# Patient Record
Sex: MALE | Race: WHITE | NOT HISPANIC OR LATINO | Employment: FULL TIME | URBAN - NONMETROPOLITAN AREA
[De-identification: names, ages, dates, MRNs, and addresses within clinical notes are randomized per-mention and may not be internally consistent; named-entity substitution may affect disease eponyms.]

---
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
## 2020-07-06 ENCOUNTER — OFFICE VISIT (OUTPATIENT)
Dept: FAMILY MEDICINE | Facility: OTHER | Age: 20
End: 2020-07-06
Attending: PHYSICIAN ASSISTANT
Payer: COMMERCIAL

## 2020-07-06 VITALS
WEIGHT: 179.2 LBS | DIASTOLIC BLOOD PRESSURE: 70 MMHG | SYSTOLIC BLOOD PRESSURE: 120 MMHG | RESPIRATION RATE: 20 BRPM | TEMPERATURE: 97.8 F | HEART RATE: 64 BPM

## 2020-07-06 DIAGNOSIS — H69.93 DYSFUNCTION OF BOTH EUSTACHIAN TUBES: Primary | ICD-10-CM

## 2020-07-06 PROCEDURE — 99213 OFFICE O/P EST LOW 20 MIN: CPT | Performed by: PHYSICIAN ASSISTANT

## 2020-07-06 RX ORDER — FLUTICASONE PROPIONATE 50 MCG
2 SPRAY, SUSPENSION (ML) NASAL DAILY
Qty: 16 G | Refills: 11 | Status: SHIPPED | OUTPATIENT
Start: 2020-07-06 | End: 2021-10-06

## 2020-07-06 ASSESSMENT — PAIN SCALES - GENERAL: PAINLEVEL: MILD PAIN (2)

## 2020-07-06 NOTE — NURSING NOTE
Chief Complaint   Patient presents with     Ear Problem         Medication Reconciliation: complete    Esperanza Valdez, LPN

## 2020-07-06 NOTE — PROGRESS NOTES
Nursing Notes:   Esperanza Valdez LPN  7/6/2020 11:03 AM  Signed  Chief Complaint   Patient presents with     Ear Problem         Medication Reconciliation: complete    Esperanza Valdez LPN      HPI:    Dante Hernandez is a 19 year old male who presents for ear pain.  Patient has had ear pain off and on over the last 2 years.  Patient was in clinic approximately 1 year ago was told he had fluid behind his eardrums.  Currently having increased ear pain after swimming for approximately 5 minutes.  Pain goes up and down depending on his swimming activities.  Feels like it is worse this year.  Had increased pain this past evening.  Previously had tried decongestion and Flonase nasal spray which helped mildly.  Not currently taking.  No other cough or cold symptoms.  No fevers, chills, sore throat, sinus pain or pressure, nasal drainage, cough.  No ear drainage.    History reviewed. No pertinent past medical history.    History reviewed. No pertinent surgical history.    History reviewed. No pertinent family history.    Social History     Tobacco Use     Smoking status: Never Smoker     Smokeless tobacco: Never Used   Substance Use Topics     Alcohol use: No       Current Outpatient Medications   Medication Sig Dispense Refill     fluticasone (FLONASE) 50 MCG/ACT nasal spray Spray 2 sprays into both nostrils daily 16 g 11       No Known Allergies    REVIEW OF SYSTEMS:  Refer to HPI.    EXAM:   Vitals:    /70 (BP Location: Right arm, Patient Position: Sitting, Cuff Size: Adult Regular)   Pulse 64   Temp 97.8  F (36.6  C)   Resp 20   Wt 81.3 kg (179 lb 3.2 oz)     General Appearance: Pleasant, alert, appropriate appearance for age. No acute distress  Ear Exam: Normal TM's bilaterally, grey, translucent, bony landmarks appreciated.   Left/Right TM: Effusion is not present. TM is not bulging. There is no pus appreciated.    Normal auditory canals and external ears. Non-tender.  OroPharynx  Exam:  Non-erythematous posterior pharynx with no exudates. No sinus pain upon palpation of frontal, ethmoid, and maxillary sinuses.  Chest/Respiratory Exam: Normal chest wall and respirations. Clear to auscultation. No retractions appreciated.  Cardiovascular Exam: Regular rate and rhythm. S1, S2, no murmur, click, gallop, or rubs.  Lymphatic Exam: Neg.  Skin: no rash or abnormalities  Psychiatric Exam: Alert and oriented - appropriate affect.    PHQ Depression Screen  No flowsheet data found.    LABS:    No results found for any visits on 07/06/20.      ASSESSMENT AND PLAN:      ICD-10-CM    1. Dysfunction of both eustachian tubes  H69.83 fluticasone (FLONASE) 50 MCG/ACT nasal spray     OTOLARYNGOLOGY REFERRAL         No infection concerns are appreciated this time.  No antibiotics are warranted at this time.    Dysfunction of both eustachian tubes: Encouraged to restart Flonase nasal spray and a decongestion such as Sudafed or Mucinex.  Encouraged to try persistently over the next 2 to 4 weeks.  Continue Flonase while he is swimming this summer.  Referred to ENT for a consult if symptoms are not calming down or worsening if needed.  Return to clinic with change/worsening of symptoms.     Patient Instructions   May use symptomatic care with tylenol or ibuprofen. Sudafed or mucinex work well for congestion.     Please take tylenol or ibuprofen as needed up to 4 times daily.      Monitor for any fevers or chills. Return in 7-10 days if not feeling better. Please call clinic with any questions or concerns. Return to clinic with change/worsening of symptoms.   Encouraged fluids and rest.    Call 9-1-1 or go to the emergency room if you:  Have trouble breathing   Are drooling because you cannot swallow your saliva   Have swelling of the neck or tongue   Cannot move your neck or have trouble opening your mouth           Gracie Kelley PA-C PA-C..................7/6/2020 11:03 AM

## 2020-07-06 NOTE — PATIENT INSTRUCTIONS
May use symptomatic care with tylenol or ibuprofen. Sudafed or mucinex work well for congestion.     Please take tylenol or ibuprofen as needed up to 4 times daily.      Monitor for any fevers or chills. Return in 7-10 days if not feeling better. Please call clinic with any questions or concerns. Return to clinic with change/worsening of symptoms.   Encouraged fluids and rest.    Call 9-1-1 or go to the emergency room if you:  Have trouble breathing   Are drooling because you cannot swallow your saliva   Have swelling of the neck or tongue   Cannot move your neck or have trouble opening your mouth

## 2020-08-03 DIAGNOSIS — H91.93 DECREASED HEARING OF BOTH EARS: Primary | ICD-10-CM

## 2020-09-29 ENCOUNTER — ALLIED HEALTH/NURSE VISIT (OUTPATIENT)
Dept: FAMILY MEDICINE | Facility: OTHER | Age: 20
End: 2020-09-29
Attending: FAMILY MEDICINE
Payer: COMMERCIAL

## 2020-09-29 DIAGNOSIS — Z20.822 ENCOUNTER FOR LABORATORY TESTING FOR COVID-19 VIRUS: Primary | ICD-10-CM

## 2020-09-29 PROCEDURE — 99207 ZZC NO CHARGE NURSE ONLY: CPT

## 2020-09-29 PROCEDURE — U0003 INFECTIOUS AGENT DETECTION BY NUCLEIC ACID (DNA OR RNA); SEVERE ACUTE RESPIRATORY SYNDROME CORONAVIRUS 2 (SARS-COV-2) (CORONAVIRUS DISEASE [COVID-19]), AMPLIFIED PROBE TECHNIQUE, MAKING USE OF HIGH THROUGHPUT TECHNOLOGIES AS DESCRIBED BY CMS-2020-01-R: HCPCS | Mod: ZL | Performed by: FAMILY MEDICINE

## 2020-09-29 PROCEDURE — C9803 HOPD COVID-19 SPEC COLLECT: HCPCS

## 2020-09-30 LAB
SARS-COV-2 RNA SPEC QL NAA+PROBE: NOT DETECTED
SPECIMEN SOURCE: NORMAL

## 2021-01-03 ENCOUNTER — HEALTH MAINTENANCE LETTER (OUTPATIENT)
Age: 21
End: 2021-01-03

## 2021-10-06 ENCOUNTER — OFFICE VISIT (OUTPATIENT)
Dept: FAMILY MEDICINE | Facility: OTHER | Age: 21
End: 2021-10-06
Attending: PHYSICIAN ASSISTANT
Payer: COMMERCIAL

## 2021-10-06 VITALS
HEIGHT: 72 IN | WEIGHT: 187.4 LBS | OXYGEN SATURATION: 95 % | BODY MASS INDEX: 25.38 KG/M2 | HEART RATE: 88 BPM | RESPIRATION RATE: 18 BRPM | TEMPERATURE: 98.1 F | SYSTOLIC BLOOD PRESSURE: 118 MMHG | DIASTOLIC BLOOD PRESSURE: 64 MMHG

## 2021-10-06 DIAGNOSIS — L84 CORN OR CALLUS: Primary | ICD-10-CM

## 2021-10-06 PROCEDURE — 99213 OFFICE O/P EST LOW 20 MIN: CPT | Performed by: PHYSICIAN ASSISTANT

## 2021-10-06 ASSESSMENT — PAIN SCALES - GENERAL: PAINLEVEL: NO PAIN (0)

## 2021-10-06 ASSESSMENT — MIFFLIN-ST. JEOR: SCORE: 1889.07

## 2021-10-07 NOTE — NURSING NOTE
Chief Complaint   Patient presents with     Foot Problems     He has a red spot on the right side of his foot and some swelling. He was told to get it checked out to see if it is a bunion. He states that his work boot has been rubbing on it.     Initial There were no vitals taken for this visit. There is no height or weight on file to calculate BMI.     FOOD SECURITY SCREENING QUESTIONS  Hunger Vital Signs:  Within the past 12 months we worried whether our food would run out before we got money to buy more. Never  Within the past 12 months the food we bought just didn't last and we didn't have money to get more. Never      Medication Reconciliation: Complete      Sohail Hardy LPN

## 2021-10-07 NOTE — PROGRESS NOTES
ASSESSMENT/PLAN:    I have reviewed the nursing notes.  I have reviewed the findings, diagnosis, plan and need for follow up with the patient.    1. Corn or callus  - Vitals stable. Exam consistent with corn due to friction of new steel toed boots. Discussed the anatomy, pathophysiology and typical treatment plan of corns, recommend looser shoe wear/changing shoes, corn pads, avoiding moisture/sweat in shoes as possible. Use of orthotics as needed for symptom control. Avoid picking at site. Discussed that surgery is not warranted for corns routinely. Patient is in agreement and understanding of the above treatment plan. All questions and concerns were addressed and answered to patient's satisfaction. AVS reviewed with patient.     Discussed warning signs/symptoms indicative of need to f/u    Follow up if symptoms persist or worsen or concerns    I explained my diagnostic considerations and recommendations to the patient, who voiced understanding and agreement with the treatment plan. All questions were answered. We discussed potential side effects of any prescribed or recommended therapies, as well as expectations for response to treatments.    Brittany Johnson PA-C  10/6/2021  7:29 PM    HPI:    Dante Hernandez is a 21 year old male  who presents to [facility] today for concerns of foot pain, right sided x a few weeks duration. He wears steel toed boots at work and got a new pair a month ago. Notes that his feet are sweaty at work and boots can feel tight at times. Pain is very minimal in nature and more of an annoyance to lateral MTP joint of small toe. Worse with sweat and tight boots, better with shoes off. Denies numbness, tingling or burning. No mechanical symptoms. No injury. No bruising, erythema or edema.     Allergies: lactose    PCP: none     History reviewed. No pertinent past medical history.  History reviewed. No pertinent surgical history.  Social History     Tobacco Use     Smoking status:  "Never Smoker     Smokeless tobacco: Never Used   Substance Use Topics     Alcohol use: No     No current outpatient medications on file.     Allergies   Allergen Reactions     Lactose      GAS     Past medical history, past surgical history, current medications and allergies reviewed and accurate to the best of my knowledge.      ROS:  Refer to HPI    /64   Pulse 88   Temp 98.1  F (36.7  C) (Tympanic)   Resp 18   Ht 1.822 m (5' 11.75\")   Wt 85 kg (187 lb 6.4 oz)   SpO2 95%   BMI 25.59 kg/m      EXAM:  General Appearance: Well appearing 21-year old male, appropriate appearance for age. No acute distress  Respiratory: normal chest wall and respirations.  Normal effort.  Clear to auscultation bilaterally, no wheezing, crackles or rhonchi.  No increased work of breathing.  No cough appreciated.  Cardiac: RRR with no murmurs  Musculoskeletal:  Equal movement of bilateral upper extremities.  Equal movement of bilateral lower extremities.  Normal gait.    Dermatological: corn to 5th toe MTP joint on right which is very minimally tender to palpation. Flesh colored skin without erythema or ecchymosis, no trauma to site. No additional symptoms to report.   Psychological: normal affect, alert, oriented, and pleasant.     Labs:  None     Xray:  None     "

## 2021-10-07 NOTE — PATIENT INSTRUCTIONS
Patient Education     Treating Corns and Calluses   If your corns or calluses are mild, reducing friction may help. Different shoes, moleskin patches, or soft pads may be all the treatment you need. In more severe cases, treating tissue buildup may require your doctor s care. Sometimes custom-made shoe inserts (orthotics) or special pads are prescribed to reduce friction and pressure.    Change shoes  If you have corns, your doctor may suggest wearing shoes that have more toe room. This way, buckled joints are less likely to be pinched against the top of the shoe. If you have calluses, wearing a cushioned insole, arch support, or heel counter can help reduce friction.  Visit your doctor  In some cases, your doctor may trim away the outer layers of skin that make up the corn or callus. For a painful corn, medicine may be injected beneath the built-up tissue.  Wear orthotics  Orthotics are specially made to meet the needs of your feet. They cushion calluses or divert pressure away from these problem areas. Worn as directed, orthotics help limit existing problems and prevent new ones from forming.  If you need surgery  If a bone or joint is out of place, certain parts of your foot may be under too much pressure. This can cause severe corns and calluses. In such cases, surgery may be the best way to correct the problem.  Outpatient procedures  In most cases, surgery to improve bone position is an outpatient procedure. Your doctor may cut away excess bone, reposition prominent bones, or even fuse joints. Sometimes tendons or ligaments are cut to reduce tension on a bone or joint. Your doctor will talk with you about the procedure that is best suited to your needs.  Alexei last reviewed this educational content on 12/1/2019 2000-2021 [employer]. All rights reserved. This information is not intended as a substitute for professional medical care. Always follow your healthcare professional's  instructions.

## 2021-10-10 ENCOUNTER — HEALTH MAINTENANCE LETTER (OUTPATIENT)
Age: 21
End: 2021-10-10

## 2022-01-29 ENCOUNTER — HEALTH MAINTENANCE LETTER (OUTPATIENT)
Age: 22
End: 2022-01-29

## 2022-08-09 ENCOUNTER — OFFICE VISIT (OUTPATIENT)
Dept: FAMILY MEDICINE | Facility: OTHER | Age: 22
End: 2022-08-09
Attending: NURSE PRACTITIONER
Payer: OTHER MISCELLANEOUS

## 2022-08-09 VITALS
TEMPERATURE: 97.2 F | OXYGEN SATURATION: 98 % | WEIGHT: 200 LBS | RESPIRATION RATE: 18 BRPM | HEART RATE: 73 BPM | HEIGHT: 72 IN | DIASTOLIC BLOOD PRESSURE: 92 MMHG | BODY MASS INDEX: 27.09 KG/M2 | SYSTOLIC BLOOD PRESSURE: 114 MMHG

## 2022-08-09 DIAGNOSIS — S61.411A LACERATION OF RIGHT HAND WITHOUT FOREIGN BODY, INITIAL ENCOUNTER: Primary | ICD-10-CM

## 2022-08-09 PROCEDURE — 250N000009 HC RX 250: Performed by: NURSE PRACTITIONER

## 2022-08-09 PROCEDURE — 90715 TDAP VACCINE 7 YRS/> IM: CPT | Performed by: NURSE PRACTITIONER

## 2022-08-09 PROCEDURE — 12001 RPR S/N/AX/GEN/TRNK 2.5CM/<: CPT | Performed by: NURSE PRACTITIONER

## 2022-08-09 PROCEDURE — 99213 OFFICE O/P EST LOW 20 MIN: CPT | Mod: 25 | Performed by: NURSE PRACTITIONER

## 2022-08-09 PROCEDURE — 90471 IMMUNIZATION ADMIN: CPT | Performed by: NURSE PRACTITIONER

## 2022-08-09 RX ORDER — LIDOCAINE HYDROCHLORIDE 10 MG/ML
5 INJECTION, SOLUTION EPIDURAL; INFILTRATION; INTRACAUDAL; PERINEURAL ONCE
Status: COMPLETED | OUTPATIENT
Start: 2022-08-09 | End: 2022-08-09

## 2022-08-09 RX ADMIN — LIDOCAINE HYDROCHLORIDE 5 ML: 10 INJECTION, SOLUTION EPIDURAL; INFILTRATION; INTRACAUDAL; PERINEURAL at 12:09

## 2022-08-09 ASSESSMENT — PAIN SCALES - GENERAL: PAINLEVEL: NO PAIN (1)

## 2022-08-09 NOTE — NURSING NOTE
Pt presents to clinic today for a work comp right hand injury. Injury was today at at [employer] construction around 0930 today. Currently soaking his right hand in Hibiclens       FOOD SECURITY SCREENING QUESTIONS:    The next two questions are to help us understand your food security.  If you are feeling you need any assistance in this area, we have resources available to support you today.    Hunger Vital Signs:  Within the past 12 months we worried whether our food would run out before we got money to buy more. Never  Within the past 12 months the food we bought just didn't last and we didn't have money to get more. Never            Medication Reconciliation: complete  Amy Corrales, ABIMAEL,LPN on 8/9/2022 at 10:52 AM

## 2022-08-09 NOTE — PROGRESS NOTES
ASSESSMENT/PLAN:    I have reviewed the nursing notes.  I have reviewed the findings, diagnosis, plan and need for follow up with the patient.    1. Laceration of right hand without foreign body, initial encounter  - lidocaine (PF) (XYLOCAINE) 1 % injection 5 mL  - TDAP VACCINE (Adacel, Boostrix)  [[id number]]  Today tetanus today.  9 sutures were placed.  Recommend returning in 12 to 14 days for removal of sutures.  He may return to work with no restrictions.  Keep the site covered with bandage for 24 hours, see AVS for additional information I discussed with patient today.    Follow up if symptoms persist or worsen or concerns    I explained my diagnostic considerations and recommendations to the patient, who voiced understanding and agreement with the treatment plan. All questions were answered. We discussed potential side effects of any prescribed or recommended therapies, as well as expectations for response to treatments.    Myra Lemus NP  8/9/2022  11:03 AM    HPI:  Dante Hernandez is a 21 year old male  who presents to [facility] today for concerns of work comp appointment for right hand injury. Injury was today at at [employer] around 0930 today. Currently soaking his right hand in Hibiclens.  Injury occurred when a rock landed on his hand.  He now has a skin flap there and he believes he needs some stitches.  He is able to bend all of his fingers, no changes to sensation.  Is able to make a fist.  The pain is very minimal at this time.  Bleeding is well controlled.    laceration to outside aspect of right hand. Injury occurred at 1 hour ago. Hemostasis control: adequate.     Pain: 2/10  Changes to ROM/Strength: none  Treatments tried since injury: none.     Any allergies to suture or latex: Unknown  Prior experience with local anesthetics: No  Any adverse reaction to local anesthetics: No    Patient on blood thinners: No    Denies LOC. Denies SOB, fevers, chills, local or systemic signs  of infection.     Immunization (Tetanus) UTD: No; records not visible. Updated today in office.     PCP: none       No past medical history on file.  No past surgical history on file.  Social History     Tobacco Use     Smoking status: Never Smoker     Smokeless tobacco: Never Used   Substance Use Topics     Alcohol use: No     No current outpatient medications on file.     Allergies   Allergen Reactions     Lactose      GAS     Past medical history, past surgical history, current medications and allergies reviewed and accurate to the best of my knowledge.      ROS:  Refer to HPI    BP (!) 114/92 (BP Location: Right arm, Patient Position: Sitting, Cuff Size: Adult Large)   Pulse 73   Temp 97.2  F (36.2  C) (Tympanic)   Resp 18   Ht 1.829 m (6')   Wt 90.7 kg (200 lb)   SpO2 98%   BMI 27.12 kg/m      EXAM:  General Appearance: Well appearing 21 year old male, appropriate appearance for age. No acute distress   Respiratory:  No increased work of breathing.  No cough appreciated.  Dermatological: skin flap ; circumscribed. Fatty tissue exposed. Closed well, well approximated. See note below.   Psychological: normal affect, alert, oriented, and pleasant.     Procedural note:   Options are discussed and patient decided to proceed with the suture placement.  Risks and benefits discussed.  Verbal consent obtained.    Preparation: Patient was prepped and draped in usual sterile fashion.  Irrigation solution: saline   Body area: right hand: lateral aspect over fatty tissue adjacent to 5th metatarsal region  Laceration description: Circumferential  Laceration length: 1 cm in diameter   Contamination: No  Debridement: No  Foreign bodies: No  Tendon involvement: No  Anesthesia: Local  Anesthetic Type: Lidocaine 1% without epinephrine  Anesthetic Total: 3 cc  Closure: Simple  Suture: 4-0 Nylon, nonabsorbable, interrupted  Number of Sutures: 9  Approximation: close  Suture removal in 12-14 day(s)     Patient tolerance:  Patient tolerated the procedure well with no immediate complications.

## 2022-08-09 NOTE — PATIENT INSTRUCTIONS
1. Keep stitches absolutely dry for first 24 hours, then you may wash and shower as you normally would. Avoid soaking stitches as this can slow down healing and raise your chance of getting an infection.   2. Do not wash area for 24 hours after sutures were placed  3. After 24 hours you may remove the dressing and keep wound open to air  4. Bathing is permitted after 48 hours from suture placement  5. Use Tylenol or Ibuprofen for comfort.  6. Watch for signs of infection such as:    increased pain after 24 hours   Increased temperature   Redness or swelling   Yellow or greenish discharge   Foul odor  7. Return to clinic if any of the above signs are present  8. Return to clinic in 12-14 days for suture removal    May return to work without restrictions but recommend keeping covered to prevent infection. Triple antibiotic ointment once daily . TDAP updated today.

## 2022-09-18 ENCOUNTER — HEALTH MAINTENANCE LETTER (OUTPATIENT)
Age: 22
End: 2022-09-18

## 2023-05-07 ENCOUNTER — HEALTH MAINTENANCE LETTER (OUTPATIENT)
Age: 23
End: 2023-05-07

## 2024-05-04 ENCOUNTER — OFFICE VISIT (OUTPATIENT)
Dept: FAMILY MEDICINE | Facility: OTHER | Age: 24
End: 2024-05-04
Attending: STUDENT IN AN ORGANIZED HEALTH CARE EDUCATION/TRAINING PROGRAM
Payer: COMMERCIAL

## 2024-05-04 VITALS
WEIGHT: 235 LBS | SYSTOLIC BLOOD PRESSURE: 114 MMHG | OXYGEN SATURATION: 97 % | DIASTOLIC BLOOD PRESSURE: 70 MMHG | RESPIRATION RATE: 16 BRPM | HEIGHT: 72 IN | BODY MASS INDEX: 31.83 KG/M2 | TEMPERATURE: 97.9 F | HEART RATE: 63 BPM

## 2024-05-04 DIAGNOSIS — R10.31 RLQ ABDOMINAL PAIN: Primary | ICD-10-CM

## 2024-05-04 LAB
ALBUMIN UR-MCNC: NEGATIVE MG/DL
ANION GAP SERPL CALCULATED.3IONS-SCNC: 9 MMOL/L (ref 7–15)
APPEARANCE UR: ABNORMAL
BASOPHILS # BLD AUTO: 0 10E3/UL (ref 0–0.2)
BASOPHILS NFR BLD AUTO: 0 %
BILIRUB UR QL STRIP: NEGATIVE
BUN SERPL-MCNC: 19.6 MG/DL (ref 6–20)
CALCIUM SERPL-MCNC: 9.1 MG/DL (ref 8.6–10)
CHLORIDE SERPL-SCNC: 100 MMOL/L (ref 98–107)
COLOR UR AUTO: ABNORMAL
CREAT SERPL-MCNC: 0.92 MG/DL (ref 0.67–1.17)
CRP SERPL-MCNC: <3 MG/L
DEPRECATED HCO3 PLAS-SCNC: 26 MMOL/L (ref 22–29)
EGFRCR SERPLBLD CKD-EPI 2021: >90 ML/MIN/1.73M2
EOSINOPHIL # BLD AUTO: 0.3 10E3/UL (ref 0–0.7)
EOSINOPHIL NFR BLD AUTO: 5 %
ERYTHROCYTE [DISTWIDTH] IN BLOOD BY AUTOMATED COUNT: 12.9 % (ref 10–15)
GLUCOSE SERPL-MCNC: 94 MG/DL (ref 70–99)
GLUCOSE UR STRIP-MCNC: NEGATIVE MG/DL
HCT VFR BLD AUTO: 45.4 % (ref 40–53)
HGB BLD-MCNC: 15.3 G/DL (ref 13.3–17.7)
HGB UR QL STRIP: NEGATIVE
IMM GRANULOCYTES # BLD: 0 10E3/UL
IMM GRANULOCYTES NFR BLD: 0 %
KETONES UR STRIP-MCNC: NEGATIVE MG/DL
LEUKOCYTE ESTERASE UR QL STRIP: NEGATIVE
LYMPHOCYTES # BLD AUTO: 2.5 10E3/UL (ref 0.8–5.3)
LYMPHOCYTES NFR BLD AUTO: 42 %
MCH RBC QN AUTO: 29.1 PG (ref 26.5–33)
MCHC RBC AUTO-ENTMCNC: 33.7 G/DL (ref 31.5–36.5)
MCV RBC AUTO: 87 FL (ref 78–100)
MONOCYTES # BLD AUTO: 0.6 10E3/UL (ref 0–1.3)
MONOCYTES NFR BLD AUTO: 10 %
NEUTROPHILS # BLD AUTO: 2.6 10E3/UL (ref 1.6–8.3)
NEUTROPHILS NFR BLD AUTO: 43 %
NITRATE UR QL: NEGATIVE
NRBC # BLD AUTO: 0 10E3/UL
NRBC BLD AUTO-RTO: 0 /100
PH UR STRIP: 7 [PH] (ref 5–9)
PLATELET # BLD AUTO: 277 10E3/UL (ref 150–450)
POTASSIUM SERPL-SCNC: 4 MMOL/L (ref 3.4–5.3)
RBC # BLD AUTO: 5.25 10E6/UL (ref 4.4–5.9)
RBC URINE: 2 /HPF
SODIUM SERPL-SCNC: 135 MMOL/L (ref 135–145)
SP GR UR STRIP: 1.02 (ref 1–1.03)
UROBILINOGEN UR STRIP-MCNC: NORMAL MG/DL
WBC # BLD AUTO: 6 10E3/UL (ref 4–11)
WBC URINE: 2 /HPF

## 2024-05-04 PROCEDURE — 36415 COLL VENOUS BLD VENIPUNCTURE: CPT | Mod: ZL | Performed by: STUDENT IN AN ORGANIZED HEALTH CARE EDUCATION/TRAINING PROGRAM

## 2024-05-04 PROCEDURE — 80048 BASIC METABOLIC PNL TOTAL CA: CPT | Mod: ZL | Performed by: STUDENT IN AN ORGANIZED HEALTH CARE EDUCATION/TRAINING PROGRAM

## 2024-05-04 PROCEDURE — 99213 OFFICE O/P EST LOW 20 MIN: CPT | Performed by: STUDENT IN AN ORGANIZED HEALTH CARE EDUCATION/TRAINING PROGRAM

## 2024-05-04 PROCEDURE — 86140 C-REACTIVE PROTEIN: CPT | Mod: ZL | Performed by: STUDENT IN AN ORGANIZED HEALTH CARE EDUCATION/TRAINING PROGRAM

## 2024-05-04 PROCEDURE — 81003 URINALYSIS AUTO W/O SCOPE: CPT | Mod: ZL | Performed by: STUDENT IN AN ORGANIZED HEALTH CARE EDUCATION/TRAINING PROGRAM

## 2024-05-04 PROCEDURE — 85025 COMPLETE CBC W/AUTO DIFF WBC: CPT | Mod: ZL | Performed by: STUDENT IN AN ORGANIZED HEALTH CARE EDUCATION/TRAINING PROGRAM

## 2024-05-04 ASSESSMENT — PAIN SCALES - GENERAL: PAINLEVEL: SEVERE PAIN (7)

## 2024-05-04 NOTE — PATIENT INSTRUCTIONS
Muscle strain    Recommend naproxen 500 mg twice a day for 1 week.  Alternatively ibuprofen 600 mg 2-3 times a day.  This will help with inflammation.  Take with food.      Ice to the area.    Rest.    Resume activities as tolerated.    Follow-up with primary care for persisting symptoms.  Return to [facility] or [facility] for worsening or changing symptoms.

## 2024-05-04 NOTE — NURSING NOTE
Chief Complaint   Patient presents with    Abdominal Pain     RLQ since yesterday     Patient felt pain for the first time while playing basketball yesterday am  Waited to see if pain would resolve - pain is still 7/10  Last BM yesterday at 4 p - normal  Increased activity worsens pain.    Initial /70 (BP Location: Left arm, Patient Position: Sitting, Cuff Size: Adult Large)   Pulse 63   Temp 97.9  F (36.6  C) (Tympanic)   Resp 16   Ht 1.829 m (6')   Wt 106.6 kg (235 lb)   SpO2 97%   BMI 31.87 kg/m   Estimated body mass index is 31.87 kg/m  as calculated from the following:    Height as of this encounter: 1.829 m (6').    Weight as of this encounter: 106.6 kg (235 lb).     Advance Care Directive on file? no    FOOD SECURITY SCREENING QUESTIONS:    The next two questions are to help us understand your food security.  If you are feeling you need any assistance in this area, we have resources available to support you today.    Hunger Vital Signs:  Within the past 12 months we worried whether our food would run out before we got money to buy more. Never  Within the past 12 months the food we bought just didn't last and we didn't have money to get more. Never  Jaida Wilson LPN,LPN on 5/4/2024 at 2:01 PM      Jaida Wilson LPN

## 2024-05-06 NOTE — PROGRESS NOTES
Assessment & Plan     (R10.31) RLQ abdominal pain  (primary encounter diagnosis)    Comment: Right lower quadrant abdominal pain.  Most likely this is musculoskeletal.  Happened right after playing basketball/injury.  Labs were completed today, no evidence of elevated white blood cell count, CRP normal.  BMP normal.  No blood in urine, no evidence of urinary tract infection.    Plan: CBC and Differential, Basic Metabolic Panel,         CRP inflammation, UA Macroscopic with reflex to        Microscopic and Culture    Discussed use of ibuprofen or naproxen with Tylenol.  Ice.  Rest.  Resume activities as tolerated.  Follow-up with primary care for any persisting symptoms.  Return to [facility] or ER for worsening or changing symptoms.  He is comfortable and agreeable with this plan.      Dot Howell is a 23 year old, presenting for the following health issues:  Abdominal Pain (RLQ since yesterday)    HPI     Patient presents today with concerns of right lower quadrant pain.  He states that started yesterday after he was playing basketball and moved a certain way.  He notes that pain is still persistent today.  He notes that the pain is worse with movement or trying to do things like sit ups.  Better with rest.  He has been trying stretching and relaxing.  He denies any history of abdominal surgeries.    He notes no urinary changes such as blood in the urine.  Last bowel movement was yesterday, normal.  No fevers.      Review of Systems  Constitutional, HEENT, cardiovascular, pulmonary, gi and gu systems are negative, except as otherwise noted.      Objective    /70 (BP Location: Left arm, Patient Position: Sitting, Cuff Size: Adult Large)   Pulse 63   Temp 97.9  F (36.6  C) (Tympanic)   Resp 16   Ht 1.829 m (6')   Wt 106.6 kg (235 lb)   SpO2 97%   BMI 31.87 kg/m    Body mass index is 31.87 kg/m .    Physical Exam   GENERAL: alert and no distress  NECK: no adenopathy, no asymmetry, masses, or  scars  RESP: lungs clear to auscultation - no rales, rhonchi or wheezes  CV: regular rate and rhythm, normal S1 S2, no S3 or S4, no murmur, click or rub, no peripheral edema  ABDOMEN: soft, tender in RLQ, no rebound or guarding, painful with valsalva, no bulges felt on valsalva, no hepatosplenomegaly, no masses and bowel sounds normal  MS: no gross musculoskeletal defects noted, no edema    Results for orders placed or performed in visit on 05/04/24   UA Macroscopic with reflex to Microscopic and Culture     Status: Abnormal    Specimen: Urine, Clean Catch   Result Value Ref Range    Color Urine Light Yellow Colorless, Straw, Light Yellow, Yellow    Appearance Urine Slightly Cloudy (A) Clear    Glucose Urine Negative Negative mg/dL    Bilirubin Urine Negative Negative    Ketones Urine Negative Negative mg/dL    Specific Gravity Urine 1.022 1.000 - 1.030    Blood Urine Negative Negative    pH Urine 7.0 5.0 - 9.0    Protein Albumin Urine Negative Negative mg/dL    Urobilinogen Urine Normal Normal, 2.0 mg/dL    Nitrite Urine Negative Negative    Leukocyte Esterase Urine Negative Negative    RBC Urine 2 <=2 /HPF    WBC Urine 2 <=5 /HPF    Narrative    Urine Culture not indicated   Basic Metabolic Panel     Status: Normal   Result Value Ref Range    Sodium 135 135 - 145 mmol/L    Potassium 4.0 3.4 - 5.3 mmol/L    Chloride 100 98 - 107 mmol/L    Carbon Dioxide (CO2) 26 22 - 29 mmol/L    Anion Gap 9 7 - 15 mmol/L    Urea Nitrogen 19.6 6.0 - 20.0 mg/dL    Creatinine 0.92 0.67 - 1.17 mg/dL    GFR Estimate >90 >60 mL/min/1.73m2    Calcium 9.1 8.6 - 10.0 mg/dL    Glucose 94 70 - 99 mg/dL   CRP inflammation     Status: Normal   Result Value Ref Range    CRP Inflammation <3.00 <5.00 mg/L   CBC with platelets and differential     Status: None   Result Value Ref Range    WBC Count 6.0 4.0 - 11.0 10e3/uL    RBC Count 5.25 4.40 - 5.90 10e6/uL    Hemoglobin 15.3 13.3 - 17.7 g/dL    Hematocrit 45.4 40.0 - 53.0 %    MCV 87 78 - 100 fL     MCH 29.1 26.5 - 33.0 pg    MCHC 33.7 31.5 - 36.5 g/dL    RDW 12.9 10.0 - 15.0 %    Platelet Count 277 150 - 450 10e3/uL    % Neutrophils 43 %    % Lymphocytes 42 %    % Monocytes 10 %    % Eosinophils 5 %    % Basophils 0 %    % Immature Granulocytes 0 %    NRBCs per 100 WBC 0 <1 /100    Absolute Neutrophils 2.6 1.6 - 8.3 10e3/uL    Absolute Lymphocytes 2.5 0.8 - 5.3 10e3/uL    Absolute Monocytes 0.6 0.0 - 1.3 10e3/uL    Absolute Eosinophils 0.3 0.0 - 0.7 10e3/uL    Absolute Basophils 0.0 0.0 - 0.2 10e3/uL    Absolute Immature Granulocytes 0.0 <=0.4 10e3/uL    Absolute NRBCs 0.0 10e3/uL   CBC and Differential     Status: None    Narrative    The following orders were created for panel order CBC and Differential.  Procedure                               Abnormality         Status                     ---------                               -----------         ------                     CBC with platelets and d...[[id number]]                      Final result                 Please view results for these tests on the individual orders.         Signed Electronically by: Gracie Willson PA-C

## 2024-07-14 ENCOUNTER — HEALTH MAINTENANCE LETTER (OUTPATIENT)
Age: 24
End: 2024-07-14

## 2025-07-19 ENCOUNTER — HEALTH MAINTENANCE LETTER (OUTPATIENT)
Age: 25
End: 2025-07-19

## (undated) RX ORDER — NEOMYCIN/BACITRACIN/POLYMYXINB 3.5-400-5K
OINTMENT (GRAM) TOPICAL
Status: DISPENSED
Start: 2022-08-09

## (undated) RX ORDER — LIDOCAINE HYDROCHLORIDE 10 MG/ML
INJECTION, SOLUTION EPIDURAL; INFILTRATION; INTRACAUDAL; PERINEURAL
Status: DISPENSED
Start: 2022-08-09